# Patient Record
Sex: FEMALE | Race: WHITE | NOT HISPANIC OR LATINO | Employment: OTHER | URBAN - METROPOLITAN AREA
[De-identification: names, ages, dates, MRNs, and addresses within clinical notes are randomized per-mention and may not be internally consistent; named-entity substitution may affect disease eponyms.]

---

## 2024-09-05 ENCOUNTER — APPOINTMENT (OUTPATIENT)
Dept: UROLOGY | Facility: CLINIC | Age: 54
End: 2024-09-05
Payer: COMMERCIAL

## 2024-09-05 VITALS
HEART RATE: 88 BPM | BODY MASS INDEX: 30.36 KG/M2 | WEIGHT: 165 LBS | HEIGHT: 62 IN | SYSTOLIC BLOOD PRESSURE: 118 MMHG | DIASTOLIC BLOOD PRESSURE: 82 MMHG

## 2024-09-05 DIAGNOSIS — R10.9 FLANK PAIN: ICD-10-CM

## 2024-09-05 LAB
POC BILIRUBIN, URINE: NEGATIVE
POC BLOOD, URINE: NEGATIVE
POC GLUCOSE, URINE: NEGATIVE MG/DL
POC KETONES, URINE: NEGATIVE MG/DL
POC LEUKOCYTES, URINE: NEGATIVE
POC NITRITE,URINE: NEGATIVE
POC PH, URINE: 5.5 PH
POC PROTEIN, URINE: NEGATIVE MG/DL
POC SPECIFIC GRAVITY, URINE: >=1.03
POC UROBILINOGEN, URINE: 1 EU/DL

## 2024-09-05 PROCEDURE — 3008F BODY MASS INDEX DOCD: CPT | Performed by: UROLOGY

## 2024-09-05 PROCEDURE — 99214 OFFICE O/P EST MOD 30 MIN: CPT | Performed by: UROLOGY

## 2024-09-05 PROCEDURE — 81003 URINALYSIS AUTO W/O SCOPE: CPT | Performed by: UROLOGY

## 2024-09-05 RX ORDER — FOLIC ACID 1 MG/1
TABLET ORAL DAILY
COMMUNITY

## 2024-09-05 RX ORDER — NALTREXONE HYDROCHLORIDE 50 MG/1
50 TABLET, FILM COATED ORAL DAILY
COMMUNITY

## 2024-09-05 RX ORDER — PREGABALIN 200 MG/1
200 CAPSULE ORAL 2 TIMES DAILY
COMMUNITY

## 2024-09-05 RX ORDER — ARIPIPRAZOLE 10 MG/1
10 TABLET ORAL DAILY
COMMUNITY

## 2024-09-05 RX ORDER — MULTIVITAMIN/IRON/FOLIC ACID 18MG-0.4MG
1 TABLET ORAL DAILY
COMMUNITY

## 2024-09-05 RX ORDER — TRAZODONE HYDROCHLORIDE 50 MG/1
50 TABLET ORAL NIGHTLY
COMMUNITY

## 2024-09-05 NOTE — PROGRESS NOTES
Addendum  Patient's right flank pain was inadequately controlled, therefore further imaging studies necessary with CT scan    09/05/2024  Complaining of right flank pain for a month, 6/10    History of kidney stone, status post lithotripsy    We discussed right flank pain, history of kidney stone, CT abdomen and pelvis without contrast  All the questions were answered, the patient expressed understanding and agreed to the plan.    Impression  Right flank pain  History of left kidney stone status post lithotripsy    Plan  Stat CT abdomen and pelvis without contrast for right flank pain  Call patient result    Chief Complaint   Patient presents with    Nephrolithiasis     Patient is here today new patient visit for some right side flank pain.  She has a history of kidney stones.        Physical Exam     TODAYS LAB RESULTS:  Glucose, Urine  NEGATIVE mg/dl NEGATIVE   POC Bilirubin, Urine  NEGATIVE NEGATIVE   POC Ketones, Urine  NEGATIVE mg/dl NEGATIVE   POC Specific Gravity, Urine  1.005 - 1.035 >=1.030   POC Blood, Urine  NEGATIVE NEGATIVE   POC PH, Urine  No Reference Range Established PH 5.5   POC Protein, Urine  NEGATIVE, 30 (1+) mg/dl NEGATIVE   POC Urobilinogen, Urine  0.2, 1.0 EU/DL 1.0   Poc Nitrite, Urine  NEGATIVE NEGATIVE   POC Leukocytes, Urine  NEGATIVE NEGATIVE       ASSESSMENT&PLAN:      IMPRESSIONS:  5/18/22  1. Right flank pain   Dull ache, varies in severity   Sometimes radiates across the back   Does have degenerative disc disease   Maybe some gross hematuria one episode pink urine      2. History of kidney stones  L URS for CaOx stone 9/2021  this was first episode     12/17/21  1. Bilateral flank pain  Radiating from midline back to lateral back. Does have DCD chronically.   Unfortunately lost insurance and has not had PCP follow up; has been off Eliquis   No fevers, chills  No blood in the urine      2. History of kidney stones  L URS for CaOx stone 9/2021  this was first episode         9/30/21  Presents for follow-up after ureteroscopy for impacted proximal left ureteral calculus 9/24/2021. Was scheduled for stent removal next week but had severe dysuria and stent related symptoms. Presents today for stent removal. No fever or chills.     8/31/21  Established Patient   PROBLEM LIST:  1. Left obstructing ureteral stone with associated sepsis  -Status post cystoscopy with left ureteral stent insertion 7/25/2021 by Dr. Gandhi. Presents today to discuss stone treatment. Continues to have stent pain and gross hematuria. No fever or chills.   Notes mild persistent LE symmetric edema since hospital admission.       Surgery  9/30/2021 stent removal Dr. Gordon  9/20/2021 cystoscopy left ureteroscopy holmium laser lithotripsy and stent change left Dr. Gordon  7/24/2021 cystoscopy retrograde pyelogram left ureteral stent

## 2024-09-06 ENCOUNTER — EVALUATION (OUTPATIENT)
Dept: OCCUPATIONAL THERAPY | Facility: HOSPITAL | Age: 54
End: 2024-09-06
Payer: COMMERCIAL

## 2024-09-06 DIAGNOSIS — M79.7 FIBROMYALGIA: ICD-10-CM

## 2024-09-06 PROCEDURE — 97750 PHYSICAL PERFORMANCE TEST: CPT | Mod: GO | Performed by: OCCUPATIONAL THERAPIST

## 2024-09-06 ASSESSMENT — PAIN SCALES - GENERAL: PAINLEVEL_OUTOF10: 8

## 2024-09-06 ASSESSMENT — PAIN - FUNCTIONAL ASSESSMENT: PAIN_FUNCTIONAL_ASSESSMENT: 0-10

## 2024-09-06 NOTE — PROGRESS NOTES
Occupational Therapy    Occupational Therapy Evaluation    Name: Poonam Appiah  MRN: [id number]  : 1970  Date: 24  Time Calculation  Start Time: 1200  Stop Time: 1400  Time Calculation (min): 120 min      Subjective   Current Problem:  1. Fibromyalgia  Referral to Occupational Therapy        General:   OT Received On: 24  General  Reason for Referral: Functional Capacity Evaluation completed this date.  Referred By: Dr. Paiz  General Comment: See details in [employer] report.  Precautions:  Precautions  Precautions Comment: None    Pain Assessment:  Pain Assessment  Pain Assessment: 0-10  0-10 (Numeric) Pain Score: 8    Objective   Prior Function Per Pt/Caregiver Report:   IND with ADL and IADL tasks. Increased time, pt has assistance if needed. Higher level IADL tasks such has cleaning bathroom and sweeping are challenging.      OP EDUCATION:  Education  Individual(s) Educated: Patient  Risk and Benefits Discussed with Patient/Caregiver/Other: yes  Patient/Caregiver Demonstrated Understanding: yes  Plan of Care Discussed and Agreed Upon: yes    Assessment:   Pt tolerated OT functional capacity evaluation this date. Pt reporting 9/10 pain after OT treatment. Pt ambulated out of gym without AE/DME.     Plan:  Outpatient Plan  OT Plan: IE/DC FCE Eval Only  Rehab Potential: Good  Plan of Care Agreement: Patient    Goals:  IE/DC no goals established.

## 2024-09-08 ENCOUNTER — HOSPITAL ENCOUNTER (OUTPATIENT)
Dept: RADIOLOGY | Facility: HOSPITAL | Age: 54
End: 2024-09-08
Payer: COMMERCIAL

## 2024-09-10 ENCOUNTER — HOSPITAL ENCOUNTER (OUTPATIENT)
Dept: RADIOLOGY | Facility: HOSPITAL | Age: 54
Discharge: HOME | End: 2024-09-10
Payer: COMMERCIAL

## 2024-09-10 DIAGNOSIS — R10.9 FLANK PAIN: ICD-10-CM

## 2024-09-10 PROCEDURE — 74176 CT ABD & PELVIS W/O CONTRAST: CPT | Performed by: RADIOLOGY

## 2024-09-10 PROCEDURE — 74176 CT ABD & PELVIS W/O CONTRAST: CPT
